# Patient Record
Sex: MALE | Race: WHITE
[De-identification: names, ages, dates, MRNs, and addresses within clinical notes are randomized per-mention and may not be internally consistent; named-entity substitution may affect disease eponyms.]

---

## 2019-10-18 ENCOUNTER — APPOINTMENT (OUTPATIENT)
Dept: ORTHOPEDIC SURGERY | Facility: CLINIC | Age: 29
End: 2019-10-18

## 2020-09-21 ENCOUNTER — TRANSCRIPTION ENCOUNTER (OUTPATIENT)
Age: 30
End: 2020-09-21

## 2021-03-11 ENCOUNTER — APPOINTMENT (OUTPATIENT)
Dept: ORTHOPEDIC SURGERY | Facility: CLINIC | Age: 31
End: 2021-03-11
Payer: COMMERCIAL

## 2021-03-11 VITALS
BODY MASS INDEX: 26.07 KG/M2 | HEIGHT: 68 IN | SYSTOLIC BLOOD PRESSURE: 120 MMHG | DIASTOLIC BLOOD PRESSURE: 79 MMHG | HEART RATE: 63 BPM | WEIGHT: 172 LBS

## 2021-03-11 PROCEDURE — 99203 OFFICE O/P NEW LOW 30 MIN: CPT

## 2021-03-11 PROCEDURE — 99072 ADDL SUPL MATRL&STAF TM PHE: CPT

## 2021-03-11 PROCEDURE — 73590 X-RAY EXAM OF LOWER LEG: CPT | Mod: RT

## 2021-03-12 NOTE — ADDENDUM
[FreeTextEntry1] : This note was written by Martha Arana on 03/11/2021 acting solely as a scribe for Dr. Jose Luis Naik.\par \par All medical record entries made by the Scribe were at my, Dr. Jose Luis Naik, direction and personally dictated by me on 03/11/2021. I have personally reviewed the chart and agree that the record accurately reflects my personal performance of the history, physical exam, assessment and plan.

## 2021-03-12 NOTE — PHYSICAL EXAM
[de-identified] : Oriented to time, place, person\par Mood: Normal\par Affect: Normal\par Appearance: Healthy, well appearing, no acute distress.\par Gait: Normal\par Assistive Devices: None\par \par Right lower extremity exam\par \par Skin: Clean, dry, intact\par Inspection: No obvious malalignment, no swelling, no effusion, mild pes planus/hindfoot valgus deformity. +fullness to distal anterior compartment  \par Pulses: 2+ DP/PT pulses\par ROM: RIGHT 15 degrees of dorsiflexion, 30 degrees of plantarflexion with pain, 10 degrees of subtalar motion. \par Tenderness: No tenderness over the lateral malleolus, no CFL/ATFL/PTFL pain. No medial malleolus pain, no deltoid ligament pain. No proximal fibular pain. No heel pain. No pain over the medial tibia.  No tenderness over the mass.  Mild tenderness to syndesmosis.\par Stability: Negative anterior/posterior drawer.\par Strength: 5/5 TA/GS/EHL\par Neuro: In tact to light touch throughout [de-identified] : The following radiographs were ordered and read by me during this patients visit. I reviewed each radiograph in detail with the patient and discussed the findings as highlighted below. \par \par 3 views of the right tib/fib were obtained today, 03/11/2021, that show no acute fracture or dislocation. There is no degenerative change seen. There is no gross malalignment. No significant other obvious osseous abnormality, otherwise unremarkable.

## 2021-03-12 NOTE — DISCUSSION/SUMMARY
[de-identified] : 29 y/o male with right lower extremity pain\par \par A discussion was had with the patient regarding his symptoms.  His symptoms are mild, and do not interfere with his running ability.  He reports some fullness through the distal anterior compartment of the right lower extremity that resolves with activity.  Patient does have some pain of the distal syndesmosis that may be consistent with a mild sprain.  We discussed symptomatic treatment with local ice, proper warm up, activity modification, appropriate shoewear, and symptomatic therapies.\par \par Recommendations; Voltaren Rx given. Conservative care & observation.  Care as above\par \par Follow up as needed if symptoms persists/worsen.

## 2021-03-12 NOTE — HISTORY OF PRESENT ILLNESS
[de-identified] : 30 year old male seen previously for bilateral lower extremity pain presents today for evaluation of right lower extremity "raised muscle" around the shin x 1 month. Patient states that the bump decreases when he runs. No injury reported. Patient runs 5 x per week. Changes shoes every 4 months and runs on the road. The area is tender to touch but does not effect his activities. Has used a foam roller with no relief. The pain is dull and intermittent. Denies numbness or tingling. \par \par The patient's past medical history, past surgical history, medications and allergies were reviewed by me today with the patient and documented accordingly. In addition, the patient's family and social history, which were noncontributory to this visit, were reviewed also.

## 2021-07-26 ENCOUNTER — RX RENEWAL (OUTPATIENT)
Age: 31
End: 2021-07-26

## 2021-07-26 RX ORDER — DICLOFENAC SODIUM 1% 10 MG/G
1 GEL TOPICAL
Qty: 100 | Refills: 0 | Status: ACTIVE | COMMUNITY
Start: 2021-03-11 | End: 1900-01-01

## 2021-08-05 ENCOUNTER — APPOINTMENT (OUTPATIENT)
Dept: ORTHOPEDIC SURGERY | Facility: CLINIC | Age: 31
End: 2021-08-05
Payer: COMMERCIAL

## 2021-08-05 VITALS
WEIGHT: 168 LBS | BODY MASS INDEX: 25.46 KG/M2 | SYSTOLIC BLOOD PRESSURE: 126 MMHG | HEART RATE: 48 BPM | HEIGHT: 68 IN | DIASTOLIC BLOOD PRESSURE: 74 MMHG

## 2021-08-05 DIAGNOSIS — M79.604 PAIN IN RIGHT LEG: ICD-10-CM

## 2021-08-05 PROCEDURE — 99213 OFFICE O/P EST LOW 20 MIN: CPT

## 2021-08-05 NOTE — DISCUSSION/SUMMARY
[de-identified] : 31 y/o male with bilateral lower extremity pain\par \par A discussion was had with the patient regarding his symptoms.  Patient has minimal pain on examination today, but with continued discomfort around the anterior tibial tendon from likely overuse with running. We discussed symptomatic treatment with local ice, proper warm up, activity modification, appropriate shoewear, and symptomatic therapies.  We also discussed consideration of utilization of physical therapy to improve symptoms as opposed to considering a protracted time away from running.  Patient does have some equinus contracture and likely imbalance to the lower extremity with increased use of his anterior tibial tendon with impact loading activity.  Also associated with pes planus.\par \par Recommendations; Begin trial of PT, Rx given. Conservative care & observation.  Care as above\par \par Follow up as needed if symptoms persists/worsen.

## 2021-08-05 NOTE — PHYSICAL EXAM
[de-identified] : Oriented to time, place, person\par Mood: Normal\par Affect: Normal\par Appearance: Healthy, well appearing, no acute distress.\par Gait: Normal\par Assistive Devices: None\par \par Left lower extremity exam\par \par Skin: Clean, dry, intact\par Inspection: No swelling, no effusion, mild pes planus/hindfoot valgus deformity. Resolved fullness to distal anterior compartment  \par Pulses: 2+ DP/PT pulses\par ROM: LEFT 10 degrees of dorsiflexion, 30 degrees of plantarflexion with pain, 10 degrees of subtalar motion. \par Tenderness: +discomfort over anterior tib tendon. No tenderness over the lateral malleolus, no CFL/ATFL/PTFL pain. No medial malleolus pain, no deltoid ligament pain. No proximal fibular pain. No heel pain. No pain over the medial tibia.  No tenderness over the mass.  \par Stability: Negative anterior/posterior drawer.\par Strength: 5/5 TA/GS/EHL\par Neuro: In tact to light touch throughout \par \par Right lower extremity exam\par \par Skin: Clean, dry, intact\par Inspection: No swelling, no effusion, mild pes planus/hindfoot valgus deformity. \par Pulses: 2+ DP/PT pulses\par ROM: RIGHT 10 degrees of dorsiflexion, 30 degrees of plantarflexion with pain, 10 degrees of subtalar motion. \par Tenderness: +discomfort over anterior tib tendon. No tenderness over the lateral malleolus, no CFL/ATFL/PTFL pain. No medial malleolus pain, no deltoid ligament pain. No proximal fibular pain. No heel pain. No pain over the medial tibia.  No tenderness over the mass.  \par Stability: Negative anterior/posterior drawer.\par Strength: 5/5 TA/GS/EHL\par Neuro: In tact to light touch throughout  [de-identified] : 3 views of the right tib/fib were obtained, 03/11/2021, that show no acute fracture or dislocation. There is no degenerative change seen. There is no gross malalignment. No significant other obvious osseous abnormality, otherwise unremarkable.

## 2021-08-05 NOTE — HISTORY OF PRESENT ILLNESS
[de-identified] : 30 year old male who presents to the office for a follow-up visit. He continues to c/o ankle tightness and inflammation where the anterior tib inserts on top of the ankle. At times, he will have swelling. The tightness in right shin has improved since the last visit. For the past 5 days, he has been running on grass which helps with pain. He wants to make sure there is nothing else going on with his ankles. Patient reports running 4 miles daily with supportive shoe wear. Reports icing after his runs.

## 2021-08-05 NOTE — ADDENDUM
[FreeTextEntry1] : This note was written by Martha Arana on 08/05/2021 acting solely as a scribe for Dr. Jose Luis Naik.\par \par All medical record entries made by the Scribe were at my, Dr. Jose Luis Naik, direction and personally dictated by me on 08/05/2021. I have personally reviewed the chart and agree that the record accurately reflects my personal performance of the history, physical exam, assessment and plan.